# Patient Record
Sex: MALE | Race: WHITE | NOT HISPANIC OR LATINO
[De-identification: names, ages, dates, MRNs, and addresses within clinical notes are randomized per-mention and may not be internally consistent; named-entity substitution may affect disease eponyms.]

---

## 2018-12-17 PROBLEM — Z00.00 ENCOUNTER FOR PREVENTIVE HEALTH EXAMINATION: Status: ACTIVE | Noted: 2018-12-17

## 2019-01-03 ENCOUNTER — APPOINTMENT (OUTPATIENT)
Dept: ORTHOPEDIC SURGERY | Facility: CLINIC | Age: 64
End: 2019-01-03

## 2020-12-30 ENCOUNTER — APPOINTMENT (OUTPATIENT)
Dept: ORTHOPEDIC SURGERY | Facility: CLINIC | Age: 65
End: 2020-12-30
Payer: COMMERCIAL

## 2020-12-30 VITALS — HEIGHT: 72 IN | WEIGHT: 190 LBS | BODY MASS INDEX: 25.73 KG/M2

## 2020-12-30 DIAGNOSIS — Z78.9 OTHER SPECIFIED HEALTH STATUS: ICD-10-CM

## 2020-12-30 DIAGNOSIS — E78.00 PURE HYPERCHOLESTEROLEMIA, UNSPECIFIED: ICD-10-CM

## 2020-12-30 DIAGNOSIS — I10 ESSENTIAL (PRIMARY) HYPERTENSION: ICD-10-CM

## 2020-12-30 DIAGNOSIS — Z80.9 FAMILY HISTORY OF MALIGNANT NEOPLASM, UNSPECIFIED: ICD-10-CM

## 2020-12-30 DIAGNOSIS — C32.9 MALIGNANT NEOPLASM OF LARYNX, UNSPECIFIED: ICD-10-CM

## 2020-12-30 PROCEDURE — 73030 X-RAY EXAM OF SHOULDER: CPT | Mod: LT

## 2020-12-30 PROCEDURE — 99072 ADDL SUPL MATRL&STAF TM PHE: CPT

## 2020-12-30 PROCEDURE — 99203 OFFICE O/P NEW LOW 30 MIN: CPT

## 2020-12-30 RX ORDER — LISINOPRIL 5 MG/1
5 TABLET ORAL
Qty: 30 | Refills: 0 | Status: ACTIVE | COMMUNITY
Start: 2020-12-18

## 2020-12-30 RX ORDER — ATORVASTATIN CALCIUM 10 MG/1
10 TABLET, FILM COATED ORAL
Qty: 30 | Refills: 0 | Status: ACTIVE | COMMUNITY
Start: 2020-02-28

## 2020-12-30 RX ORDER — ACETAMINOPHEN AND CODEINE 300; 30 MG/1; MG/1
300-30 TABLET ORAL
Qty: 6 | Refills: 0 | Status: ACTIVE | COMMUNITY
Start: 2020-12-05

## 2020-12-30 RX ORDER — ATORVASTATIN CALCIUM 10 MG/1
10 TABLET, FILM COATED ORAL
Refills: 0 | Status: ACTIVE | COMMUNITY

## 2020-12-30 NOTE — DISCUSSION/SUMMARY
[de-identified] : Status post rotator cuff repair with continued pain. He's had no improvement for 4 years after surgery we will obtain a new MRI to further evaluate we'll follow up after mri

## 2020-12-30 NOTE — HISTORY OF PRESENT ILLNESS
[de-identified] : 64yo male presents complaining of left shoulder pain for several years. He had an MRI in 2015 showing supraspinatus tear. He underwent rotator cuff repair by a doctor at [facility] for special surgery, April 2016. He went to physical therapy postoperatively. He continues to complain of pain. He feels that the surgery did not help. He reports a new type of pain anterior shoulder that feels like a bruise. No recent injuries. Denies numbness tingling\par \par The patient's past medical history, past surgical history, medications, allergies, and social history were reviewed by me today with the patient and documented accordingly. In addition, the patient's family history, which is noncontributory to this visit, was also reviewed.\par

## 2020-12-30 NOTE — PHYSICAL EXAM
[de-identified] : General Exam\par \par Well developed, well nourished\par No apparent distress\par Oriented to person, place, and time\par Mood: Normal\par Affect: Normal\par Balance and coordination: Normal\par Gait: Normal\par \par Left shoulder exam\par \par Inspection: No swelling, ecchymosis or gross deformity.\par Skin: No masses, No lesions\par Tenderness: + bicipital tenderness, no tenderness to the greater tuberosity/RTC insertion, no anterior shoulder/lesser tuberosity tenderness. No tenderness SC joint, +ttp clavicle, AC joint.\par ROM: 160/60/T6\par Impingement tests: Positive Tyson\par AC Joint: no pain with cross arm testing\par Biceps: Negative speed\par Strength: 5-/5 abduction, external rotation, and internal rotation\par Neuro: AIN, PIN, Ulnar nerve motor intact\par Sensation: Intact to light touch in radial, median, ulnar, and axillary nerve distributions\par Vasc: 2+ radial pulse\par  [de-identified] : \par The following radiographs were ordered and read by me during this patients visit. I reviewed each radiograph in detail with the patient and discussed the findings as highlighted below. \par \par 3 views left shoulder obtained today. Status post distal clavicle excision. Glenohumeral joint is well maintained\par \par

## 2021-01-18 ENCOUNTER — TRANSCRIPTION ENCOUNTER (OUTPATIENT)
Age: 66
End: 2021-01-18

## 2021-01-19 ENCOUNTER — APPOINTMENT (OUTPATIENT)
Dept: MRI IMAGING | Facility: CLINIC | Age: 66
End: 2021-01-19
Payer: COMMERCIAL

## 2021-01-19 ENCOUNTER — OUTPATIENT (OUTPATIENT)
Dept: OUTPATIENT SERVICES | Facility: HOSPITAL | Age: 66
LOS: 1 days | End: 2021-01-19
Payer: COMMERCIAL

## 2021-01-19 DIAGNOSIS — M25.512 PAIN IN LEFT SHOULDER: ICD-10-CM

## 2021-01-19 PROCEDURE — 73221 MRI JOINT UPR EXTREM W/O DYE: CPT | Mod: 26,LT

## 2021-01-19 PROCEDURE — 73221 MRI JOINT UPR EXTREM W/O DYE: CPT

## 2021-02-12 ENCOUNTER — APPOINTMENT (OUTPATIENT)
Dept: ORTHOPEDIC SURGERY | Facility: CLINIC | Age: 66
End: 2021-02-12
Payer: COMMERCIAL

## 2021-02-12 PROCEDURE — 99072 ADDL SUPL MATRL&STAF TM PHE: CPT

## 2021-02-12 PROCEDURE — 99214 OFFICE O/P EST MOD 30 MIN: CPT

## 2021-02-12 NOTE — PHYSICAL EXAM
[de-identified] : left shoulder exam\par \par Inspection: No swelling, ecchymosis or gross deformity.\par Skin: No masses, No lesions\par Tenderness: + bicipital tenderness, no tenderness to the greater tuberosity/RTC insertion, no anterior shoulder/lesser tuberosity tenderness. No tenderness SC joint, +ttp clavicle and coracoid, AC joint.\par ROM: 160/60/T6\par Impingement tests: Positive Tyson\par AC Joint: no pain with cross arm testing\par Biceps: Negative speed\par Strength: 5-/5 abduction, external rotation, and internal rotation\par Neuro: AIN, PIN, Ulnar nerve motor intact\par Sensation: Intact to light touch in radial, median, ulnar, and axillary nerve distributions\par Vasc: 2+ radial pulse\par  [de-identified] : MRI of the left shoulder was reviewed. Status post rotator cuff repair with intact rotator cuff. Status post distal clavicle excision. There is mild attenuation of the long head of the biceps tendon. There is signal abnormality within the coracoid.\par \par

## 2021-02-12 NOTE — HISTORY OF PRESENT ILLNESS
[de-identified] : 64yo male presents complaining of left shoulder pain for several years. He had an MRI in 2015 showing supraspinatus tear. He underwent rotator cuff repair by a doctor at [facility] for special surgery, April 2016. He went to physical therapy postoperatively. He continues to complain of pain. He feels that the surgery did not help. He reports a new type of pain anterior shoulder that feels like a bruise. No recent injuries. Denies numbness tingling. \par A MRI was done since last visit, here to review results today.\par

## 2021-02-12 NOTE — DISCUSSION/SUMMARY
[de-identified] : 65-year-old male history rotator cuff repair he is a lesion in his coracoid on his MRI. I've asked him to obtain his old MRI for my review to see if his lesion is stable. It is a new finding we will get a CT scan to further evaluate the lesion in the coracoid consider orthopedic oncology referral based on these findings. All questions were answered

## 2021-03-15 ENCOUNTER — OUTPATIENT (OUTPATIENT)
Dept: OUTPATIENT SERVICES | Facility: HOSPITAL | Age: 66
LOS: 1 days | End: 2021-03-15
Payer: COMMERCIAL

## 2021-03-15 ENCOUNTER — APPOINTMENT (OUTPATIENT)
Dept: CT IMAGING | Facility: CLINIC | Age: 66
End: 2021-03-15
Payer: COMMERCIAL

## 2021-03-15 ENCOUNTER — RESULT REVIEW (OUTPATIENT)
Age: 66
End: 2021-03-15

## 2021-03-15 DIAGNOSIS — M25.512 PAIN IN LEFT SHOULDER: ICD-10-CM

## 2021-03-15 DIAGNOSIS — Z00.8 ENCOUNTER FOR OTHER GENERAL EXAMINATION: ICD-10-CM

## 2021-03-15 PROCEDURE — 73200 CT UPPER EXTREMITY W/O DYE: CPT

## 2021-03-15 PROCEDURE — 73200 CT UPPER EXTREMITY W/O DYE: CPT | Mod: 26,LT

## 2021-03-18 ENCOUNTER — NON-APPOINTMENT (OUTPATIENT)
Age: 66
End: 2021-03-18

## 2021-04-06 ENCOUNTER — APPOINTMENT (OUTPATIENT)
Dept: ORTHOPEDIC SURGERY | Facility: CLINIC | Age: 66
End: 2021-04-06
Payer: COMMERCIAL

## 2021-04-06 DIAGNOSIS — R91.8 OTHER NONSPECIFIC ABNORMAL FINDING OF LUNG FIELD: ICD-10-CM

## 2021-04-06 DIAGNOSIS — G89.29 PAIN IN LEFT SHOULDER: ICD-10-CM

## 2021-04-06 DIAGNOSIS — M25.512 PAIN IN LEFT SHOULDER: ICD-10-CM

## 2021-04-06 PROCEDURE — 99213 OFFICE O/P EST LOW 20 MIN: CPT

## 2021-04-06 PROCEDURE — 99072 ADDL SUPL MATRL&STAF TM PHE: CPT

## 2021-04-06 NOTE — PHYSICAL EXAM
[de-identified] : left shoulder exam\par \par Inspection: No swelling, ecchymosis or gross deformity.\par Skin: No masses, No lesions\par Tenderness: + bicipital tenderness, no tenderness to the greater tuberosity/RTC insertion, no anterior shoulder/lesser tuberosity tenderness. No tenderness SC joint, +ttp clavicle and coracoid, AC joint.\par ROM: 160/60/T6\par Impingement tests: Positive Tyson\par AC Joint: no pain with cross arm testing\par Biceps: Negative speed\par Strength: 5-/5 abduction, external rotation, and internal rotation\par Neuro: AIN, PIN, Ulnar nerve motor intact\par Sensation: Intact to light touch in radial, median, ulnar, and axillary nerve distributions\par Vasc: 2+ radial pulse [de-identified] : CT scan reviewed. There is diffuse metastatic disease. There is a long as\par \par

## 2021-04-06 NOTE — HISTORY OF PRESENT ILLNESS
[de-identified] : 66yo male presents complaining of left shoulder pain for several years. He had an MRI in 2015 showing supraspinatus tear. He underwent rotator cuff repair by a doctor at [facility] for special surgery, April 2016. He went to physical therapy postoperatively. He continues to complain of pain. He feels that the surgery did not help. He reports a new type of pain anterior shoulder that feels like a bruise. No recent injuries. Denies numbness tingling. \par A CT scan was done since last visit, here to review results today.

## 2021-04-06 NOTE — DISCUSSION/SUMMARY
[de-identified] : 65-year-old male initially seen for shoulder pain palpable lesion in his coracoid now found to have metastatic disease. Discussed the importance of medical follow up with this patient. Given pulmonology referral recommended he contact his medical doctor to further discuss treatment plan he expressed understanding of the importance of medical follow up at this time

## 2021-04-15 ENCOUNTER — APPOINTMENT (OUTPATIENT)
Dept: SURGICAL ONCOLOGY | Facility: CLINIC | Age: 66
End: 2021-04-15

## 2021-04-15 NOTE — PHYSICAL EXAM
[Normal] : supple, no neck mass and thyroid not enlarged [Normal Neck Lymph Nodes] : normal neck lymph nodes  [Normal Supraclavicular Lymph Nodes] : normal supraclavicular lymph nodes [Normal Axillary Lymph Nodes] : normal axillary lymph nodes [Normal] : full range of motion and no deformities appreciated [de-identified] : Below

## 2021-04-15 NOTE — HISTORY OF PRESENT ILLNESS
[de-identified] : 65-year-old man referred by Ms. Ugalde IRVING (RPA-C, from [facility]: Dr. 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 Tramjeremi VILLATORO.\par And, Dr. Grant CAGE\par \par \par PMD: Dr. Cherelle ROPER.\par \par No pacemaker or defibrillator.\par No anticoagulants.\par \par Hypertension is treated with lisinopril.\par He takes Lipitor for hypercholesterolemia

## 2021-04-15 NOTE — REASON FOR VISIT
[FreeTextEntry2] : 4-15-21, he did not keep his consultation appointment to discuss the recently diagnosed adenocarcinoma of the scalp, possible metastatic focus

## 2021-04-15 NOTE — ASSESSMENT
[FreeTextEntry1] : 4-15-21, he did not keep his consultation appointment to discuss the recently diagnosed adenocarcinoma of the scalp, possible metastatic focus DISPLAY PLAN FREE TEXT

## 2021-04-21 PROBLEM — D49.2 NEOPLASM OF SCALP: Status: ACTIVE | Noted: 2021-04-13

## 2021-04-22 ENCOUNTER — APPOINTMENT (OUTPATIENT)
Dept: SURGICAL ONCOLOGY | Facility: CLINIC | Age: 66
End: 2021-04-22

## 2021-04-22 DIAGNOSIS — D49.2 NEOPLASM OF UNSPECIFIED BEHAVIOR OF BONE, SOFT TISSUE, AND SKIN: ICD-10-CM

## 2021-04-22 NOTE — ASSESSMENT
[FreeTextEntry1] : 4-22-21:\par He did not keep his appointment for consultation regarding an adenocarcinoma of the scalp (possibly metastatic)\par \par While this could represent a primary skin appendage carcinoma, since the pathologist cannot exclude a metastatic focus is a possible diagnosis, I have suggested preoperative imaging consisting of:\par CT chest/abdomen/pelvis.\par

## 2021-04-22 NOTE — REVIEW OF SYSTEMS
[Negative] : Heme/Lymph [FreeTextEntry8] : Family history of colon cancer [de-identified] : Scalp adenocarcinoma, possible metastatic focus

## 2021-04-22 NOTE — REASON FOR VISIT
[FreeTextEntry2] : 4-22-21:\par He did not keep his appointment for consultation regarding an adenocarcinoma of the scalp (possibly metastatic)

## 2021-04-22 NOTE — HISTORY OF PRESENT ILLNESS
[de-identified] : 65-year-old man referred by Ms. Ugalde IRVING (RPA-C, from [facility]: Dr. 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 Tramjeremi VILLATORO.\par And, Dr. Grant CAGE\par \par \par PMD: Dr. Cherelle ROPER.\par \par No pacemaker or defibrillator.\par No anticoagulants.\par \par Hypertension is treated with lisinopril.\par He takes Lipitor for hypercholesterolemia

## 2021-04-22 NOTE — PHYSICAL EXAM
[Normal] : supple, no neck mass and thyroid not enlarged [Normal Neck Lymph Nodes] : normal neck lymph nodes  [Normal Supraclavicular Lymph Nodes] : normal supraclavicular lymph nodes [Normal Axillary Lymph Nodes] : normal axillary lymph nodes [Normal] : full range of motion and no deformities appreciated [de-identified] : Below